# Patient Record
Sex: FEMALE | Race: WHITE | URBAN - METROPOLITAN AREA
[De-identification: names, ages, dates, MRNs, and addresses within clinical notes are randomized per-mention and may not be internally consistent; named-entity substitution may affect disease eponyms.]

---

## 2017-03-22 ENCOUNTER — HISTORICAL (OUTPATIENT)
Dept: ADMINISTRATIVE | Facility: HOSPITAL | Age: 77
End: 2017-03-22

## 2017-05-03 ENCOUNTER — HOSPITAL ENCOUNTER (OUTPATIENT)
Dept: ONCOLOGY | Facility: HOSPITAL | Age: 77
End: 2017-05-05
Attending: INTERNAL MEDICINE | Admitting: INTERNAL MEDICINE

## 2017-05-03 LAB
ABS NEUT (OLG): 6.99 X10(3)/MCL (ref 2.1–9.2)
ALBUMIN SERPL-MCNC: 2.8 GM/DL (ref 3.4–5)
ALBUMIN/GLOB SERPL: 0.7 {RATIO}
ALP SERPL-CCNC: 83 UNIT/L (ref 38–126)
ALT SERPL-CCNC: 7 UNIT/L (ref 12–78)
APPEARANCE, UA: ABNORMAL
APTT PPP: 25.6 SECOND(S) (ref 20.6–36)
AST SERPL-CCNC: 12 UNIT/L (ref 15–37)
BACTERIA SPEC CULT: ABNORMAL /HPF
BASOPHILS # BLD AUTO: 0 X10(3)/MCL (ref 0–0.2)
BASOPHILS NFR BLD AUTO: 0 %
BILIRUB SERPL-MCNC: 0.4 MG/DL (ref 0.2–1)
BILIRUB UR QL STRIP: NEGATIVE
BILIRUBIN DIRECT+TOT PNL SERPL-MCNC: 0.1 MG/DL (ref 0–0.2)
BILIRUBIN DIRECT+TOT PNL SERPL-MCNC: 0.3 MG/DL (ref 0–0.8)
BUN SERPL-MCNC: 24 MG/DL (ref 7–18)
CALCIUM SERPL-MCNC: 9 MG/DL (ref 8.5–10.1)
CHLORIDE SERPL-SCNC: 106 MMOL/L (ref 98–107)
CO2 SERPL-SCNC: 21 MMOL/L (ref 21–32)
COLOR UR: YELLOW
CREAT SERPL-MCNC: 1.58 MG/DL (ref 0.55–1.02)
EOSINOPHIL # BLD AUTO: 0 X10(3)/MCL (ref 0–0.9)
EOSINOPHIL NFR BLD AUTO: 0 %
ERYTHROCYTE [DISTWIDTH] IN BLOOD BY AUTOMATED COUNT: 14.9 % (ref 11.5–17)
GLOBULIN SER-MCNC: 3.9 GM/DL (ref 2.4–3.5)
GLUCOSE (UA): NEGATIVE
GLUCOSE SERPL-MCNC: 111 MG/DL (ref 74–106)
GROUP & RH: NORMAL
HCT VFR BLD AUTO: 28.3 % (ref 37–47)
HCT VFR BLD AUTO: 29 % (ref 37–47)
HCT VFR BLD AUTO: 31.8 % (ref 37–47)
HGB BLD-MCNC: 10.3 GM/DL (ref 12–16)
HGB BLD-MCNC: 9 GM/DL (ref 12–16)
HGB BLD-MCNC: 9.3 GM/DL (ref 12–16)
HGB UR QL STRIP: ABNORMAL
INR PPP: 1.55 (ref 0–1.27)
KETONES UR QL STRIP: NEGATIVE
LEUKOCYTE ESTERASE UR QL STRIP: ABNORMAL
LIPASE SERPL-CCNC: 289 UNIT/L (ref 73–393)
LYMPHOCYTES # BLD AUTO: 2.1 X10(3)/MCL (ref 0.6–4.6)
LYMPHOCYTES NFR BLD AUTO: 21 %
MCH RBC QN AUTO: 28.6 PG (ref 27–31)
MCHC RBC AUTO-ENTMCNC: 32.4 GM/DL (ref 33–36)
MCV RBC AUTO: 88.3 FL (ref 80–94)
MONOCYTES # BLD AUTO: 0.7 X10(3)/MCL (ref 0.1–1.3)
MONOCYTES NFR BLD AUTO: 7 %
NEUTROPHILS # BLD AUTO: 6.99 X10(3)/MCL (ref 1.4–7.9)
NEUTROPHILS NFR BLD AUTO: 70 %
NITRITE UR QL STRIP: NEGATIVE
PH UR STRIP: 6 [PH] (ref 5–9)
PLATELET # BLD AUTO: 330 X10(3)/MCL (ref 130–400)
PMV BLD AUTO: 9.1 FL (ref 9.4–12.4)
POTASSIUM SERPL-SCNC: 4.4 MMOL/L (ref 3.5–5.1)
PROT SERPL-MCNC: 6.7 GM/DL (ref 6.4–8.2)
PROT UR QL STRIP: NEGATIVE
PROTHROMBIN TIME: 18.3 SECOND(S) (ref 12.1–14.2)
RBC # BLD AUTO: 3.6 X10(6)/MCL (ref 4.2–5.4)
RBC #/AREA URNS HPF: 13 /HPF (ref 0–2)
SODIUM SERPL-SCNC: 136 MMOL/L (ref 136–145)
SP GR UR STRIP: 1.01 (ref 1–1.03)
SQUAMOUS EPITHELIAL, UA: ABNORMAL
UROBILINOGEN UR STRIP-ACNC: 0.2
WBC # SPEC AUTO: 9.9 X10(3)/MCL (ref 4.5–11.5)
WBC #/AREA URNS HPF: 7 /HPF (ref 0–3)

## 2017-05-04 LAB
ABS NEUT (OLG): 3.6 X10(3)/MCL (ref 2.1–9.2)
BASOPHILS # BLD AUTO: 0 X10(3)/MCL (ref 0–0.2)
BASOPHILS NFR BLD AUTO: 0 %
BUN SERPL-MCNC: 16 MG/DL (ref 7–18)
CALCIUM SERPL-MCNC: 9.3 MG/DL (ref 8.5–10.1)
CHLORIDE SERPL-SCNC: 111 MMOL/L (ref 98–107)
CO2 SERPL-SCNC: 19 MMOL/L (ref 21–32)
CREAT SERPL-MCNC: 1.29 MG/DL (ref 0.55–1.02)
EOSINOPHIL # BLD AUTO: 0.1 X10(3)/MCL (ref 0–0.9)
EOSINOPHIL NFR BLD AUTO: 2 %
ERYTHROCYTE [DISTWIDTH] IN BLOOD BY AUTOMATED COUNT: 15.4 % (ref 11.5–17)
GLUCOSE SERPL-MCNC: 109 MG/DL (ref 74–106)
HCT VFR BLD AUTO: 30.2 % (ref 37–47)
HGB BLD-MCNC: 9.4 GM/DL (ref 12–16)
LYMPHOCYTES # BLD AUTO: 1.8 X10(3)/MCL (ref 0.6–4.6)
LYMPHOCYTES NFR BLD AUTO: 30 %
MAGNESIUM SERPL-MCNC: 1.9 MG/DL (ref 1.8–2.4)
MCH RBC QN AUTO: 28.4 PG (ref 27–31)
MCHC RBC AUTO-ENTMCNC: 31.1 GM/DL (ref 33–36)
MCV RBC AUTO: 91.2 FL (ref 80–94)
MONOCYTES # BLD AUTO: 0.4 X10(3)/MCL (ref 0.1–1.3)
MONOCYTES NFR BLD AUTO: 7 %
NEUTROPHILS # BLD AUTO: 3.6 X10(3)/MCL (ref 1.4–7.9)
NEUTROPHILS NFR BLD AUTO: 60 %
PHOSPHATE SERPL-MCNC: 2.5 MG/DL (ref 2.5–4.9)
PLATELET # BLD AUTO: 268 X10(3)/MCL (ref 130–400)
PMV BLD AUTO: 9.1 FL (ref 9.4–12.4)
POTASSIUM SERPL-SCNC: 4.2 MMOL/L (ref 3.5–5.1)
RBC # BLD AUTO: 3.31 X10(6)/MCL (ref 4.2–5.4)
SODIUM SERPL-SCNC: 140 MMOL/L (ref 136–145)
WBC # SPEC AUTO: 6 X10(3)/MCL (ref 4.5–11.5)

## 2017-05-05 LAB
ABS NEUT (OLG): 2.93 X10(3)/MCL (ref 2.1–9.2)
BASOPHILS NFR BLD MANUAL: 1 % (ref 0–2)
BURR CELLS BLD QL SMEAR: 1
EOSINOPHIL NFR BLD MANUAL: 1 % (ref 0–8)
ERYTHROCYTE [DISTWIDTH] IN BLOOD BY AUTOMATED COUNT: 15.4 % (ref 11.5–17)
HCT VFR BLD AUTO: 29.1 % (ref 37–47)
HGB BLD-MCNC: 9.2 GM/DL (ref 12–16)
LYMPHOCYTES NFR BLD MANUAL: 23 % (ref 13–40)
LYMPHOCYTES NFR BLD MANUAL: 5 %
MCH RBC QN AUTO: 28 PG (ref 27–31)
MCHC RBC AUTO-ENTMCNC: 31.6 GM/DL (ref 33–36)
MCV RBC AUTO: 88.7 FL (ref 80–94)
MONOCYTES NFR BLD MANUAL: 4 % (ref 2–11)
NEUTROPHILS NFR BLD MANUAL: 66 % (ref 47–80)
PLATELET # BLD AUTO: 254 X10(3)/MCL (ref 130–400)
PLATELET # BLD EST: NORMAL 10*3/UL
PMV BLD AUTO: 9.2 FL (ref 7.4–10.4)
POIKILOCYTOSIS BLD QL SMEAR: 2
POLYCHROMASIA BLD QL SMEAR: 1
RBC # BLD AUTO: 3.28 X10(6)/MCL (ref 4.2–5.4)
SCHISTOCYTES BLD QL AUTO: 1
WBC # SPEC AUTO: 5.3 X10(3)/MCL (ref 4.5–11.5)

## 2018-10-24 ENCOUNTER — HOSPITAL ENCOUNTER (OUTPATIENT)
Dept: MEDSURG UNIT | Facility: HOSPITAL | Age: 78
End: 2018-10-27
Attending: INTERNAL MEDICINE | Admitting: INTERNAL MEDICINE

## 2018-10-24 LAB
ABS NEUT (OLG): 14.31 X10(3)/MCL (ref 2.1–9.2)
ABS NEUT (OLG): 15.18 X10(3)/MCL (ref 2.1–9.2)
ALBUMIN SERPL-MCNC: 2.9 GM/DL (ref 3.4–5)
ALBUMIN/GLOB SERPL: 0.8 {RATIO}
ALP SERPL-CCNC: 101 UNIT/L (ref 38–126)
ALT SERPL-CCNC: 16 UNIT/L (ref 12–78)
APPEARANCE, UA: ABNORMAL
APTT PPP: 36.2 SECOND(S) (ref 24.8–36.9)
AST SERPL-CCNC: 60 UNIT/L (ref 15–37)
BACTERIA SPEC CULT: ABNORMAL /HPF
BASOPHILS # BLD AUTO: 0 X10(3)/MCL (ref 0–0.2)
BASOPHILS # BLD AUTO: 0 X10(3)/MCL (ref 0–0.2)
BASOPHILS NFR BLD AUTO: 0 %
BASOPHILS NFR BLD AUTO: 0 %
BILIRUB SERPL-MCNC: 0.4 MG/DL (ref 0.2–1)
BILIRUB UR QL STRIP: NEGATIVE
BILIRUBIN DIRECT+TOT PNL SERPL-MCNC: 0.1 MG/DL (ref 0–0.2)
BILIRUBIN DIRECT+TOT PNL SERPL-MCNC: 0.3 MG/DL (ref 0–0.8)
BUN SERPL-MCNC: 78 MG/DL (ref 7–18)
BUN SERPL-MCNC: 78 MG/DL (ref 7–18)
CALCIUM SERPL-MCNC: 10.3 MG/DL (ref 8.5–10.1)
CALCIUM SERPL-MCNC: 10.6 MG/DL (ref 8.5–10.1)
CHLORIDE SERPL-SCNC: 113 MMOL/L (ref 98–107)
CHLORIDE SERPL-SCNC: 115 MMOL/L (ref 98–107)
CK SERPL-CCNC: 294 UNIT/L (ref 26–192)
CO2 SERPL-SCNC: 24 MMOL/L (ref 21–32)
CO2 SERPL-SCNC: 25 MMOL/L (ref 21–32)
COLOR UR: YELLOW
CREAT SERPL-MCNC: 2.49 MG/DL (ref 0.55–1.02)
CREAT SERPL-MCNC: 2.62 MG/DL (ref 0.55–1.02)
CREAT/UREA NIT SERPL: 31.3
EOSINOPHIL # BLD AUTO: 0 X10(3)/MCL (ref 0–0.9)
EOSINOPHIL # BLD AUTO: 0.1 X10(3)/MCL (ref 0–0.9)
EOSINOPHIL NFR BLD AUTO: 0 %
EOSINOPHIL NFR BLD AUTO: 0 %
ERYTHROCYTE [DISTWIDTH] IN BLOOD BY AUTOMATED COUNT: 14 % (ref 11.5–17)
ERYTHROCYTE [DISTWIDTH] IN BLOOD BY AUTOMATED COUNT: 14 % (ref 11.5–17)
GLOBULIN SER-MCNC: 3.7 GM/DL (ref 2.4–3.5)
GLUCOSE (UA): NEGATIVE
GLUCOSE SERPL-MCNC: 104 MG/DL (ref 74–106)
GLUCOSE SERPL-MCNC: 119 MG/DL (ref 74–106)
HCT VFR BLD AUTO: 40.2 % (ref 37–47)
HCT VFR BLD AUTO: 40.4 % (ref 37–47)
HGB BLD-MCNC: 12.3 GM/DL (ref 12–16)
HGB BLD-MCNC: 12.3 GM/DL (ref 12–16)
HGB UR QL STRIP: ABNORMAL
INR PPP: 1.89 (ref 0–1.27)
KETONES UR QL STRIP: NEGATIVE
LACTATE SERPL-SCNC: 1.6 MMOL/L (ref 0.4–2)
LEUKOCYTE ESTERASE UR QL STRIP: ABNORMAL
LYMPHOCYTES # BLD AUTO: 2.4 X10(3)/MCL (ref 0.6–4.6)
LYMPHOCYTES # BLD AUTO: 2.5 X10(3)/MCL (ref 0.6–4.6)
LYMPHOCYTES NFR BLD AUTO: 13 %
LYMPHOCYTES NFR BLD AUTO: 14 %
MAGNESIUM SERPL-MCNC: 3.2 MG/DL (ref 1.8–2.4)
MCH RBC QN AUTO: 30.2 PG (ref 27–31)
MCH RBC QN AUTO: 30.3 PG (ref 27–31)
MCHC RBC AUTO-ENTMCNC: 30.4 GM/DL (ref 33–36)
MCHC RBC AUTO-ENTMCNC: 30.6 GM/DL (ref 33–36)
MCV RBC AUTO: 98.8 FL (ref 80–94)
MCV RBC AUTO: 99.5 FL (ref 80–94)
MONOCYTES # BLD AUTO: 1 X10(3)/MCL (ref 0.1–1.3)
MONOCYTES # BLD AUTO: 1 X10(3)/MCL (ref 0.1–1.3)
MONOCYTES NFR BLD AUTO: 5 %
MONOCYTES NFR BLD AUTO: 6 %
NEUTROPHILS # BLD AUTO: 14.31 X10(3)/MCL (ref 2.1–9.2)
NEUTROPHILS # BLD AUTO: 15.18 X10(3)/MCL (ref 2.1–9.2)
NEUTROPHILS NFR BLD AUTO: 80 %
NEUTROPHILS NFR BLD AUTO: 80 %
NITRITE UR QL STRIP: NEGATIVE
PH UR STRIP: 5 [PH] (ref 5–9)
PHOSPHATE SERPL-MCNC: 4 MG/DL (ref 2.5–4.9)
PLATELET # BLD AUTO: 287 X10(3)/MCL (ref 130–400)
PLATELET # BLD AUTO: 357 X10(3)/MCL (ref 130–400)
PMV BLD AUTO: 10.3 FL (ref 9.4–12.4)
PMV BLD AUTO: 10.5 FL (ref 9.4–12.4)
POTASSIUM SERPL-SCNC: 4.9 MMOL/L (ref 3.5–5.1)
POTASSIUM SERPL-SCNC: 5.3 MMOL/L (ref 3.5–5.1)
PROT SERPL-MCNC: 6.6 GM/DL (ref 6.4–8.2)
PROT UR QL STRIP: NEGATIVE
PROTHROMBIN TIME: 22.3 SECOND(S) (ref 12.2–14.7)
RBC # BLD AUTO: 4.06 X10(6)/MCL (ref 4.2–5.4)
RBC # BLD AUTO: 4.07 X10(6)/MCL (ref 4.2–5.4)
RBC #/AREA URNS HPF: ABNORMAL /[HPF]
SODIUM SERPL-SCNC: 147 MMOL/L (ref 136–145)
SODIUM SERPL-SCNC: 149 MMOL/L (ref 136–145)
SP GR UR STRIP: 1.02 (ref 1–1.03)
SQUAMOUS EPITHELIAL, UA: ABNORMAL
TROPONIN I SERPL-MCNC: <0.02 NG/ML (ref 0.02–0.49)
UROBILINOGEN UR STRIP-ACNC: 0.2
WBC # SPEC AUTO: 17.9 X10(3)/MCL (ref 4.5–11.5)
WBC # SPEC AUTO: 18.9 X10(3)/MCL (ref 4.5–11.5)
WBC #/AREA URNS HPF: ABNORMAL /HPF

## 2018-10-25 LAB
APTT PPP: 35.8 SECOND(S) (ref 24.8–36.9)
INR PPP: 1.55 (ref 0–1.27)
PROTHROMBIN TIME: 19.1 SECOND(S) (ref 12.2–14.7)

## 2018-10-26 LAB
ABS NEUT (OLG): 8.28 X10(3)/MCL (ref 2.1–9.2)
ALBUMIN SERPL-MCNC: 2.4 GM/DL (ref 3.4–5)
ALBUMIN/GLOB SERPL: 0.8 RATIO (ref 1.1–2)
ALP SERPL-CCNC: 76 UNIT/L (ref 38–126)
ALT SERPL-CCNC: 22 UNIT/L (ref 12–78)
AST SERPL-CCNC: 22 UNIT/L (ref 15–37)
BASOPHILS # BLD AUTO: 0 X10(3)/MCL (ref 0–0.2)
BASOPHILS NFR BLD AUTO: 0 %
BILIRUB SERPL-MCNC: 0.2 MG/DL (ref 0.2–1)
BILIRUBIN DIRECT+TOT PNL SERPL-MCNC: 0.1 MG/DL (ref 0–0.5)
BILIRUBIN DIRECT+TOT PNL SERPL-MCNC: 0.1 MG/DL (ref 0–0.8)
BUN SERPL-MCNC: 77 MG/DL (ref 7–18)
CALCIUM SERPL-MCNC: 10.3 MG/DL (ref 8.5–10.1)
CHLORIDE SERPL-SCNC: 119 MMOL/L (ref 98–107)
CO2 SERPL-SCNC: 22 MMOL/L (ref 21–32)
CREAT SERPL-MCNC: 1.66 MG/DL (ref 0.55–1.02)
EOSINOPHIL # BLD AUTO: 0.1 X10(3)/MCL (ref 0–0.9)
EOSINOPHIL NFR BLD AUTO: 1 %
ERYTHROCYTE [DISTWIDTH] IN BLOOD BY AUTOMATED COUNT: 13.9 % (ref 11.5–17)
GLOBULIN SER-MCNC: 3.2 GM/DL (ref 2.4–3.5)
GLUCOSE SERPL-MCNC: 121 MG/DL (ref 74–106)
HCT VFR BLD AUTO: 35.8 % (ref 37–47)
HGB BLD-MCNC: 11 GM/DL (ref 12–16)
LYMPHOCYTES # BLD AUTO: 1.6 X10(3)/MCL (ref 0.6–4.6)
LYMPHOCYTES NFR BLD AUTO: 15 %
MAGNESIUM SERPL-MCNC: 2.9 MG/DL (ref 1.8–2.4)
MCH RBC QN AUTO: 30.8 PG (ref 27–31)
MCHC RBC AUTO-ENTMCNC: 30.7 GM/DL (ref 33–36)
MCV RBC AUTO: 100.3 FL (ref 80–94)
MONOCYTES # BLD AUTO: 0.6 X10(3)/MCL (ref 0.1–1.3)
MONOCYTES NFR BLD AUTO: 6 %
NEUTROPHILS # BLD AUTO: 8.28 X10(3)/MCL (ref 2.1–9.2)
NEUTROPHILS NFR BLD AUTO: 78 %
PLATELET # BLD AUTO: 214 X10(3)/MCL (ref 130–400)
PMV BLD AUTO: 11.6 FL (ref 9.4–12.4)
POTASSIUM SERPL-SCNC: 4.5 MMOL/L (ref 3.5–5.1)
PROT SERPL-MCNC: 5.6 GM/DL (ref 6.4–8.2)
RBC # BLD AUTO: 3.57 X10(6)/MCL (ref 4.2–5.4)
SODIUM SERPL-SCNC: 150 MMOL/L (ref 136–145)
WBC # SPEC AUTO: 10.6 X10(3)/MCL (ref 4.5–11.5)

## 2018-10-27 LAB
ABS NEUT (OLG): 6.94 X10(3)/MCL (ref 2.1–9.2)
ALBUMIN SERPL-MCNC: 2.1 GM/DL (ref 3.4–5)
ALBUMIN/GLOB SERPL: 0.7 RATIO (ref 1.1–2)
ALP SERPL-CCNC: 75 UNIT/L (ref 38–126)
ALT SERPL-CCNC: 12 UNIT/L (ref 12–78)
AST SERPL-CCNC: 37 UNIT/L (ref 15–37)
BASOPHILS # BLD AUTO: 0 X10(3)/MCL (ref 0–0.2)
BASOPHILS NFR BLD AUTO: 0 %
BILIRUB SERPL-MCNC: 0.2 MG/DL (ref 0.2–1)
BILIRUBIN DIRECT+TOT PNL SERPL-MCNC: 0.1 MG/DL (ref 0–0.5)
BILIRUBIN DIRECT+TOT PNL SERPL-MCNC: 0.1 MG/DL (ref 0–0.8)
BUN SERPL-MCNC: 62 MG/DL (ref 7–18)
CALCIUM SERPL-MCNC: 9.4 MG/DL (ref 8.5–10.1)
CHLORIDE SERPL-SCNC: 112 MMOL/L (ref 98–107)
CO2 SERPL-SCNC: 22 MMOL/L (ref 21–32)
CREAT SERPL-MCNC: 1.48 MG/DL (ref 0.55–1.02)
EOSINOPHIL # BLD AUTO: 0.2 X10(3)/MCL (ref 0–0.9)
EOSINOPHIL NFR BLD AUTO: 2 %
ERYTHROCYTE [DISTWIDTH] IN BLOOD BY AUTOMATED COUNT: 13.7 % (ref 11.5–17)
GLOBULIN SER-MCNC: 2.9 GM/DL (ref 2.4–3.5)
GLUCOSE SERPL-MCNC: 99 MG/DL (ref 74–106)
HCT VFR BLD AUTO: 33.6 % (ref 37–47)
HGB BLD-MCNC: 10.3 GM/DL (ref 12–16)
LYMPHOCYTES # BLD AUTO: 2 X10(3)/MCL (ref 0.6–4.6)
LYMPHOCYTES NFR BLD AUTO: 20 %
MAGNESIUM SERPL-MCNC: 2.5 MG/DL (ref 1.8–2.4)
MCH RBC QN AUTO: 30.3 PG (ref 27–31)
MCHC RBC AUTO-ENTMCNC: 30.7 GM/DL (ref 33–36)
MCV RBC AUTO: 98.8 FL (ref 80–94)
MONOCYTES # BLD AUTO: 0.6 X10(3)/MCL (ref 0.1–1.3)
MONOCYTES NFR BLD AUTO: 6 %
NEUTROPHILS # BLD AUTO: 6.94 X10(3)/MCL (ref 2.1–9.2)
NEUTROPHILS NFR BLD AUTO: 70 %
PLATELET # BLD AUTO: 258 X10(3)/MCL (ref 130–400)
PMV BLD AUTO: 10.6 FL (ref 9.4–12.4)
POTASSIUM SERPL-SCNC: 3.9 MMOL/L (ref 3.5–5.1)
PROT SERPL-MCNC: 5 GM/DL (ref 6.4–8.2)
RBC # BLD AUTO: 3.4 X10(6)/MCL (ref 4.2–5.4)
SODIUM SERPL-SCNC: 142 MMOL/L (ref 136–145)
WBC # SPEC AUTO: 9.9 X10(3)/MCL (ref 4.5–11.5)

## 2022-04-10 ENCOUNTER — HISTORICAL (OUTPATIENT)
Dept: ADMINISTRATIVE | Facility: HOSPITAL | Age: 82
End: 2022-04-10

## 2022-04-26 VITALS
DIASTOLIC BLOOD PRESSURE: 60 MMHG | SYSTOLIC BLOOD PRESSURE: 98 MMHG | WEIGHT: 152.13 LBS | HEIGHT: 63 IN | BODY MASS INDEX: 26.95 KG/M2

## 2022-04-30 NOTE — DISCHARGE SUMMARY
Patient:   Earline Rader            MRN: [id number]            FIN: [id number]               Age:   77 years     Sex:  Female     :  1940   Associated Diagnoses:   None   Author:   Cody Evangelista MD      Physical Examination   General:  Alert and oriented, No acute distress.    Eye:  Pupils are equal, round and reactive to light, Extraocular movements are intact, Normal conjunctiva.    HENT:  Normocephalic, Normal hearing, Oral mucosa is moist.    Neck:  Supple, No carotid bruit, No jugular venous distention, No thyromegaly.    Respiratory:  Lungs are clear to auscultation, Respirations are non-labored, Breath sounds are equal, No chest wall tenderness.    Cardiovascular:  Normal rate, Regular rhythm, No murmur, No gallop, No edema.    Gastrointestinal:  Soft, Non-tender, Non-distended, Normal bowel sounds, No organomegaly.       Vital Signs (last 24 hrs)_____  Last Charted___________  Temp Oral     37.5 DegC  (MAY 05 07:)  Heart Rate Peripheral   70 bpm  (MAY 05 07:)  Resp Rate         20 br/min  (MAY 05 07:)  SBP      137 mmHg  (MAY 05 07:)  DBP      74 mmHg  (MAY 05 07:)  SpO2      95 %  (MAY 05 07:21)  Weight      68.8 kg  (MAY 04 09:)  Height      165 cm  (MAY 04 09:)  BMI      25.27  (MAY 04 09:)     Lymphatics:  No lymphadenopathy neck, axilla, groin.    Musculoskeletal:  No tenderness, No swelling.    Integumentary:  Warm, Dry, Intact, No pallor, No rash.    Neurologic:  Alert, Oriented, Normal sensory, No focal deficits.    Psychiatric:  Cooperative, Appropriate mood & affect.    Genitourinary:  No costovertebral angle tenderness, No inguinal tenderness.       Hospital Course   Hospital Course   Admitted from: This is a 77-year-old female who comes in with complaints of hematemesis for which she was admitted and GI was consulted.  Patient was recently diagnosed with A. fib and has been on Eliquis which was held on admit.  Patient underwent an EGD on 5/3/2017 that shows  grade D reflux esophagitis and medium size sliding hiatal hernia and some erythematous antrum.  GI had cleared the patient for oral diet which patient is eating and tolerating well.  H&H monitored.  Patient also diagnosed with UTI present on admit which has grown Enterococcus faecalis.  Patient initially given Rocephin 1 g IV once and then later switched to penicillin and Zosyn IV while in hospital.  This will be switched to ampicillin 250 mg by mouth twice a day ×1 week on discharge based on sensitivities.  GI recommends restarting Eliquis on 5/6/2017 if overt bleeding seen.  Patient comes in from [facility] but will be discharged home with home health per family wishes.  Over the course of her stay patient is now clinically improved and hemodynamically stable for discharge to home when cleared by GI.    Discharge diagnosis:    Hematemesis  UTI with Enterococcus faecalis  Nausea and vomiting  Recent diagnosis of atrial fibrillation,  History of hypertension  History of Parkinson's disease  Dementia  History of GERD  Chronic kidney disease stage III       Discharge home with home health if okay by GI  Diet equal soft bland cardiac diet ×1 week and then slowly advance as tolerated  Activity ad vida.  Follow-up with PCP and GI ×1 week  Total discharge time = 31 minutes.        Discharge Plan   Discharge Summary Plan   Discharge Status: stable.

## 2022-04-30 NOTE — DISCHARGE SUMMARY
Patient:   Earline Rader            MRN: [id number]            FIN: [id number]               Age:   78 years     Sex:  Female     :  1940   Associated Diagnoses:   None   Author:   Carrol SANCHEZ, Vamshi TURPIN      Results Review   please see today's progress note for DC summary. time spent on discharge disposition included the following: final examination of the patient; discussion of the hospital stay; instructions for continuing care; final diagnoses; patient/family counseling; preparation of discharge records; preparation of prescriptions; referral forms; chart review.  Total time spent on discharge disposition was 36 minutes.

## 2022-04-30 NOTE — ED PROVIDER NOTES
Patient:   Earline Rader            MRN: [id number]            FIN: [id number]               Age:   78 years     Sex:  Female     :  1940   Associated Diagnoses:   Acute hypoactive delirium due to another medical condition; Dehydration; Acute UTI   Author:   Jonas Jalloh MD      Basic Information   Time seen: Date & time 10/24/2018 15:46:00.   Arrival mode: Private vehicle.   History limitation: None.   Additional information: Chief Complaint from Nursing Triage Note : Chief Complaint   10/24/2018 15:34 CDT     Chief Complaint           c/o increased confusion, lethargy, and not responding to questioning worsening over last 2 days and n/v today. Reports decreased appetite and oral intakex2 dasys. Hx Parkinson's, DM, and afib. On blood thinners.   (Modified) .   History source: Daughter.      History of Present Illness   The patient presents with 77 y/o female who presents with daughter for increase in confusion, not acting normal baseline, slumped over in chair. per daughter this started monday but worsened today. hx parkinsons, dm, afib. on thinners. cbg 144. eboutte, fnp.  The onset was 3  days ago and gradual.  The course/duration of symptoms is worsening.  The character of symptoms is decreased responsiveness and slumping to the left, which is a new finding for her.  The degree at onset was moderate.  The degree at present is moderate.  Baseline status: confused.   The exacerbating factor is none.  The relieving factor is none.  Risk factors consist of hypertension.  Prior episodes: none.  Therapy today: none.  Associated symptoms: none.  Additional history: none.        Review of Systems   Constitutional symptoms:  Negative except as documented in HPI.   Skin symptoms:  Negative except as documented in HPI.   Eye symptoms:  Negative except as documented in HPI.   ENMT symptoms:  Negative except as documented in HPI.   Respiratory symptoms:  Negative except as documented in HPI.    Cardiovascular symptoms:  Negative except as documented in HPI.   Gastrointestinal symptoms:  Negative except as documented in HPI.   Genitourinary symptoms:  Negative except as documented in HPI.   Musculoskeletal symptoms:  Negative except as documented in HPI.   Neurologic symptoms:  Negative except as documented in HPI.   Psychiatric symptoms:  Negative except as documented in HPI.   Endocrine symptoms:  Negative except as documented in HPI.   Hematologic/Lymphatic symptoms:  Negative except as documented in HPI.   Allergy/immunologic symptoms:  Negative except as documented in HPI.             Additional review of systems information: All other systems reviewed and otherwise negative.      Health Status   Allergies:    Allergic Reactions (Selected)  Severity Not Documented  Aspirin- No reactions were documented.,    Allergies (1) Active Reaction  aspirin None Documented.   Medications:  (Selected)   Prescriptions  Prescribed  Eliquis 2.5 mg oral tablet: 2.5 mg = 1 tab(s), Oral, BID, # 60 tab(s), 11 Refill(s)  Protonix 40 mg ORAL enteric coated tablet: 40 mg = 1 tab(s), Oral, BID, And then Protonix 40 g by mouth daily ×30 days  #30tabs, # 28 tab(s), 0 Refill(s)  Sinemet 25 mg-100 mg oral tablet: 1 tab(s), Oral, TID, # 90 tab(s), 5 Refill(s), Pharmacy: [place] [id number] [facility]  metoprolol tartrate 25 mg oral tab: 25 mg = 1 tab(s), Oral, BID, # 60 tab(s), 11 Refill(s)  Documented Medications  Documented  Linzess: 145 mcg, Oral, Daily, PRN constipation, 0 Refill(s)  Megace: 400 mg = 10 mL, Oral, BID, 0 Refill(s).      Past Medical/ Family/ Social History   Medical history:    Resolved  Benign hematuria (15617017):  Resolved.  Atrial fibrillation (10885605):  Resolved.  Gastro-esophageal reflux disease without esophagitis (678790797):  Resolved.  Dementia (30791922):  Resolved.  Parkinson disease (0928553758):  Resolved.  Chronic kidney disease (7650507492):  Resolved.  Constipation (36243730):  Resolved.  Muscle  weakness (39106160):  Resolved.  Nausea & vomiting ([id number]):  Resolved.  Gait abnormality ([id number]):  Resolved.  Lack of coordination (7470421980):  Resolved.  Cognitive communication disorder (0351141459):  Resolved.  Malaise (419184330):  Resolved.  Anorexia (017799126):  Resolved..   Surgical history:    Esophagogastroduodenoscopy performed by Trevon Simpson MD on 5/3/2017 at 77 Years.  Comments:  5/3/2017 14:11 - Quincy BERNSTEIN, Rani VILLANUEVA  auto-populated from documented surgical case  Hysterectomy (SNOMED CT 199476278)..   Family history:    Kidney disease.  Mother  Peripheral vascular disease.  Sister  .   Social history: Not significant.   Problem list:    Active Problems (8)  Acid reflux   Acute disease or injury-related malnutrition   Constipation   Hypertension   Impaired mobility   Impaired mobility   Parkinson disease   Restless leg syndrome .      Physical Examination               Vital Signs   Vital Signs   10/24/2018 15:34 CDT     Temperature Temporal Artery               37 DegC                             Peripheral Pulse Rate     85 bpm                             SpO2                      95 %                             Oxygen Therapy            Room air                             Systolic Blood Pressure   107 mmHg                             Diastolic Blood Pressure  75 mmHg  .      Vital Signs (last 24 hrs)_____  Last Charted___________  Heart Rate Peripheral   85 bpm  (OCT 24 15:34)  SBP      107 mmHg  (OCT 24 15:34)  DBP      75 mmHg  (OCT 24 15:34)  SpO2      95 %  (OCT 24 15:34).   Measurements   10/24/2018 15:34 CDT     Weight Dosing             64 kg                             Weight Measured and Calculated in Lbs     141.09 lb                             Weight Estimated          64 kg                             Height/Length Dosing      157 cm                             Height/Length Estimated   157 cm                              Body Mass Index Estimated 25.96 kg/m2  .   Basic Oxygen Information   10/24/2018 15:34 CDT     SpO2                      95 %                             Oxygen Therapy            Room air  .   General:  Alert, mild distress.    [place] coma scale:  Eye response: 4 /4, verbal response: 1 /5, motor response: 4 /6, Total score: Total score: 9.    Neurological:  No focal neurological deficit observed, normal sensory observed, normal coordination observed, Level of consciousness: Lethargic, Cranial nerves II - XII: Intact, Motor strength: not moving right side spontaneously; more active with LUE/LLE, Speech: Aphasic.    Skin:  Warm, dry, pale, poor turgor, abrasions to posterior left shoulder, and lateral left hip/thigh.    Head:  Normocephalic, atraumatic.    Neck:  Supple, trachea midline, no tenderness, no JVD, no carotid bruit.    Eye:  Pupils are equal, round and reactive to light, extraocular movements are intact, normal conjunctiva, vision unchanged.    Ears, nose, mouth and throat:  Tympanic membranes clear, oral mucosa moist, no pharyngeal erythema or exudate.    Cardiovascular:  Regular rate and rhythm, No murmur, Normal peripheral perfusion, No edema.    Respiratory:  Lungs are clear to auscultation, respirations are non-labored, breath sounds are equal, Symmetrical chest wall expansion.    Chest wall:  No tenderness, No deformity.    Back:  Nontender, Normal range of motion, Normal alignment.    Musculoskeletal:  No swelling, no deformity.    Gastrointestinal:  Soft, Nontender, Non distended, Normal bowel sounds, No organomegaly.    Lymphatics:  No lymphadenopathy.   Psychiatric:  unable to assess.      Medical Decision Making   Orders  Launch Orders   Laboratory:  UA Total a reflex to culture (Order): Stat collect, Urine, 10/24/2018 15:47 CDT, Nurse collect, Print Label By Order Location  Troponin-I (Order): Stat collect, 10/24/2018 15:47 CDT, Blood, Lab Collect, Print Label By Order Location, 10/24/2018  15:47 CDT  CMP (Order): Stat collect, 10/24/2018 15:47 CDT, Blood, Lab Collect, Print Label By Order Location, 10/24/2018 15:47 CDT  PTT (Order): Stat collect, 10/24/2018 15:47 CDT, Blood, Lab Collect, Print Label By Order Location, 10/24/2018 15:47 CDT  PT (Order): Stat collect, 10/24/2018 15:47 CDT, Blood, Lab Collect, Print Label By Order Location, 10/24/2018 15:47 CDT  CBC w/ Auto Diff (Order): Now collect, 10/24/2018 15:47 CDT, Blood, Lab Collect, Print Label By Order Location, 10/24/2018 15:47 CDT  Radiology:  CT Head W/O Contrast (Order): Stat, 10/24/2018 15:47 CDT, Altered level of Consciousness, None, Patient Bed, Patient Has IV?, Rad Type, Schedule this test  Cardiology:  EKG (Order): 10/24/2018 15:47 CDT, Patient Bed, Patient Has IV, Standard Precautions, NOW, -1, -1, 10/24/2018 15:47 CDT, Launch Orders   Laboratory:  Phosphorus Level (Order Processing): Stat collect, 10/24/2018 16:00 CDT, Blood, Once, Stop date 10/24/2018 16:00 CDT, Lab Collect, Print Label By Order Location, 10/24/2018 16:00 CDT  Magnesium Level (Order Processing): Stat collect, 10/24/2018 16:00 CDT, Blood, Once, Stop date 10/24/2018 16:00 CDT, Lab Collect, Print Label By Order Location, 10/24/2018 16:00 CDT  CPK (Order Processing): Stat collect, 10/24/2018 16:00 CDT, Blood, Once, Stop date 10/24/2018 16:00 CDT, Lab Collect, Print Label By Order Location, 10/24/2018 16:00 CDT.    Electrocardiogram:  Rate 82, normal sinus rhythm, The Axis is normal.  , STT segments Non specific changes, T wave Inversion, III, , AVF, Ectopy Premature atrial contractions, P wave and ND interval WNL, QT interval WNL, QRS interval WNL, Interpretation by Emergency Physician No significant interval changes.    Results review:  Lab results : Lab View   10/24/2018 16:30 CDT     UA Appear                 TURBID                             UA Color                  YELLOW                             UA Spec Grav              1.016                             UA  Bili                   Negative                             UA pH                     5.0                             UA Urobilinogen           0.2                             UA Blood                  Trace                             UA Glucose                Negative                             UA Ketones                Negative                             UA Protein                Negative                             UA Nitrite                Negative                             UA Leuk Est               2+                             UA WBC                    2-3 /HPF                             UA RBC                    NONE SEEN                             UA Bacteria               4+ /HPF                             UA Squam Epithelial       NONE SEEN    10/24/2018 16:03 CDT     Sodium Lvl                149 mmol/L  HI                             Potassium Lvl             5.3 mmol/L  HI                             Chloride                  115 mmol/L  HI                             CO2                       24.0 mmol/L                             Calcium Lvl               10.3 mg/dL  HI                             Magnesium Lvl             3.2 mg/dL  HI                             Glucose Lvl               119 mg/dL  HI                             BUN                       78.0 mg/dL  HI                             Creatinine                2.62 mg/dL  HI                             eGFR-AA                   23 mL/min/1.73 m2  NA                             eGFR-CATHY                  19 mL/min/1.73 m2  NA                             Bili Total                0.4 mg/dL                             Bili Direct               0.10 mg/dL                             Bili Indirect             0.30 mg/dL                             AST                       60 unit/L  HI                             ALT                       16 unit/L                             Alk Phos                  101 unit/L                              Total Protein             6.6 gm/dL                             Albumin Lvl               2.90 gm/dL  LOW                             Globulin                  3.70 gm/dL  HI                             A/G Ratio                 0.8  NA                             Phosphorus                4.0 mg/dL                             Total CK                  294 unit/L  HI                             Troponin-I                <0.02 ng/mL                             PT                        22.3 second(s)  HI                             INR                       1.89  HI                             PTT                       36.2 second(s)                             WBC                       18.9 x10(3)/mcL  HI                             RBC                       4.07 x10(6)/mcL  LOW                             Hgb                       12.3 gm/dL                             Hct                       40.2 %                             Platelet                  357 x10(3)/mcL                             MCV                       98.8 fL  HI                             MCH                       30.2 pg                             MCHC                      30.6 gm/dL  LOW                             RDW                       14.0 %                             MPV                       10.3 fL                             Abs Neut                  15.18 x10(3)/mcL  HI                             Neutro Auto               80 %  NA                             Lymph Auto                13 %  NA                             Mono Auto                 5 %  NA                             Eos Auto                  0 %  NA                             Abs Eos                   0.0 x10(3)/mcL                             Basophil Auto             0 %  NA                             Abs Neutro                15.18 x10(3)/mcL  HI                             Abs Lymph                 2.5 x10(3)/mcL                             Abs Mono                   1.0 x10(3)/mcL                             Abs Baso                  0.0 x10(3)/mcL  ,    No qualifying data available.    Head Computed Tomography:  Interpretation by Radiologist,   Radiology Report     Clinical History:  Altered level of consciousness     Reference:  17 April 2017     Technique:  CT imaging of the head performed from the skull base to the vertex  without intravenous contrast. DLP 1882 mGycm. Automatic exposure  control, adjustment of mA/kV or iterative reconstruction technique was  used to reduce radiation.     Findings:  There is motion and beam hardening artifact.     No acute cortical infarct, hemorrhage or abnormal extra-axial fluid  collection identified. No midline shift, mass lesion or focal mass  effect noted. Grossly similar hypoattenuation in the cerebral white  matter. There is similar ventricular enlargement.      Visualized paranasal sinuses and mastoid air cells grossly clear.     Impression:  Exam degraded by motion and beam hardening artifact. No acute  intracranial findings are evident.       Signature Line  Electronically Signed By: Faustino Lebron MD  Date/Time Signed: 10/24/2018 17:53    .       Reexamination/ Reevaluation   Vital signs   Basic Oxygen Information   10/24/2018 15:34 CDT     SpO2                      95 %                             Oxygen Therapy            Room air        Impression and Plan   Diagnosis   Acute hypoactive delirium due to another medical condition (FWB72-EO F05)   Dehydration (HTK80-EG E86.0)   Acute UTI (UJN35-FS N39.0)      Calls-Consults   -  10/24/2018 18:46:00 , Fatou Rasheed med, phone call, recommends admit to Dr. Martins.    Plan   Condition: Stable.    Disposition: Admit time  10/24/2018 18:57:00, Admit to Inpatient Unit.    Counseled: Patient, Regarding diagnosis, Regarding diagnostic results, Regarding treatment plan, Regarding prescription.

## 2022-04-30 NOTE — H&P
Patient:   Earline Rader            MRN: [id number]            FIN: [id number]               Age:   77 years     Sex:  Female     :  1940   Associated Diagnoses:   None   Author:   Jonas Velarde MD      DATE OF ADMIT: 5/3/2017 4:43  REFERRAL SOURCE: Marian SANCHEZ   PCP: Neal SANCHEZ     CHIEF COMPLAINT: hematemesis     HISTORY OF PRESENTING ILLNESS  Patient is a 77-year-old female with past medical history of hypertension, Parkinson's, GERD, dementia, CKD3 and several recent hospitalizations.  She was last discharged on 2017 after an admission for nausea and vomiting, UTI and diagnosed with atrial fibrillation and placed on Eliquis and metoprolol for rate control.  She was discharged to [facility].  She was sent back to the ER tonight because she was noted to be vomiting bright red blood, with no information on how much.  She reports that balm was black and that she had one episode.  She currently denies any nausea or vomiting at the moment but was reported to be dry heaving in the ER but had no vomitus.  Her dementia seems advanced and she is unable to give a very adequate history. Her vital signs were stable on arrival and she was normotensive, mildly tachycardic and afebrile.  Laboratory work revealed an increase in her H&H since discharge.  Urine again shows evidence of urinary tract infection.    REVIEW OF SYSTEMS  Except as documented, all other systems reviewed and negative    PAST MEDICAL HISTORY  HTN  PAF on eliquis  CKD III  GERD  Parkinsons     PAST SURGICAL HISTORY  Hysterectomy    FAMILY HISTORY  Reviewed, noncontributory to current condition.    SOCIAL HISTORY  No reported tobacco drug or alcohol use  Quit smoking over 30 years ago  [facility] resident    ALLERGIES  aspirin    HOME MEDICATIONS  Eliquis 2.5 mg oral tablet 2.5 mg = 1 tab(s), Oral, BID  Linzess 145 mcg, PRN, Oral, Daily  Megace 400 mg = 10 mL, Oral, BID  metoprolol tartrate 25 mg oral tab 25 mg  = 1 tab(s), Oral, BID  Sinemet 25 mg-100 mg oral tablet 1 tab(s), Oral, TID      PHYSICAL EXAM  Temp Oral        37.2 DegC  (MAY 03 01:41)  Heart Rate Peripheral   90 bpm  (MAY 03 01:50)  Resp Rate             16 br/min  (MAY 03 04:10)  SBP            124 mmHg  (MAY 03 04:10)  DBP            61 mmHg  (MAY 03 04:10)  SpO2           98 %  (MAY 03 04:10)  GENERAL: awake alert and in NAD but answers questions appropriately but is confused  HEENT: Normocephalic atraumatic, cranial nerves II through XII intact, pupils equal  NECK: Supple  CHEST: Clear to auscultation bilaterally no wheezing or rales  CVS: Regular rate and rhythm, no significant peripheral edema  ABD: Soft nontender nondistended positive bowel sounds  EXTREMITIES: No clubbing cyanosis or edema  NEURO: Grossly nonfocal  SKIN: Pale dry and intact    LABS  reviewed and stable from discharge 4/27  UA shows possible UTI     RADIOLOGY  none today     ASSESSMENT  1.  Hematemesis, in setting of Eliquis, with no signs of hemodynamic instability  2.  Nausea and vomiting  3.  Dementia  4.  Recent diagnosis of atrial fibrillation, placed on Eliquis and metoprolol  5.  History of hypertension  6.  History of Parkinson's disease  7.  History of GERD  8.  Chronic renal insufficiency stage III    PLAN  - hold eliquis   - protonix IV, IVFs and trend H/H  - prn nausea medication   - possibly secondary to retching from nausea  - consider GI consult if persists     ADVANCED DIRECTIVES: None full code   VTE PROPHYLAXIS: holding AC for now, SCDs    Total time spent on admission: 71 minutes

## 2022-04-30 NOTE — H&P
Patient:   Earline Rader            MRN: [id number]            FIN: [id number]               Age:   78 years     Sex:  Female     :  1940   Associated Diagnoses:   Acute hypoactive delirium due to another medical condition; Acute UTI; Altered mental status; Anorexia; Chronic kidney disease; Dehydration   Author:   Joann SANCHEZ, Eliezer      Basic Information   Admit information:  Weakness Dementia uti  .    Source of history:  Family member.    Present at bedside:  Family member.    Referral source:  Emergency department.    History limitation:  Clinical condition.       Chief Complaint   10/24/2018 15:34 CDT     c/o increased confusion, lethargy, and not responding to questioning worsening over last 2 days and n/v today. Reports decreased appetite and oral intakex2 dasys. Hx Parkinson's, DM, and afib. On blood thinners.   (Modified)       History of Present Illness             The patient presents with A 78 year old  female presented to the er with c/o increased weakness and ams with decreased responsiveness and lethargy when she was found slumped over on the recliner no trauma dependent on all adls symptoms have been progressive for the last 3 days triaged and evaluated in the er and found to be dehydrated weak demented with contracted extremities unkept with a uti and admitted to the [facility] [place] .        Review of Systems   Has per HPI otherwise all systems reviewed and negative.   Constitutional:  Weakness, Fatigue, Decreased activity.    Eye:  Negative.    Ear/Nose/Mouth/Throat:  Negative.    Respiratory:  Negative.    Cardiovascular:  Negative.    Gastrointestinal:  Negative.    Genitourinary:  Negative.    Hematology/Lymphatics:  Negative.    Endocrine:  Negative.    Immunologic:  Immunocompromised, Recurrent fevers, Recurrent infections, Malaise.    Musculoskeletal:  Decreased range of motion.    Integumentary:  Negative.    Neurologic:  Abnormal balance, Confusion.       Health Status    Allergies:    Allergic Reactions (All)  Severity Not Documented  Aspirin- No reactions were documented.,    Allergies (1) Active Reaction  aspirin None Documented   Current medications:  (Selected)   Inpatient Medications  Ordered  Eliquis 2.5 mg oral tablet: 2.5 mg, form: Tab, Oral, BID, first dose 10/24/18 21:00:00 CDT  Protonix 40 mg ORAL enteric coated tablet: 40 mg, form: Tab-EC, Oral, BID, first dose 10/24/18 21:00:00 CDT  Protonix: 40 mg, IV Piggyback, Daily, first dose 10/24/18 20:16:00 CDT, STAT  Rocephin 1 g injection: 2 gm, form: Injection, IV Piggyback, q24hr, first dose 10/24/18 20:16:00 CDT, STAT  Sinemet 25 mg-100 mg oral tablet: 1 tab(s), form: Tab, Oral, TID, first dose 10/24/18 20:17:00 CDT  Sodium Chloride 0.9% intravenous solution 1,000 mL: 1,000 mL, 1,000 mL, IV, 125 mL/hr, start date 10/24/18 20:16:00 CDT  Zofran: 4 mg, form: Injection, IV Push, q4hr PRN for nausea, first dose 10/24/18 20:16:00 CDT, choose first if ordered with other treatments for nausea  acetaminophen: 650 mg, Oral, q6hr PRN for fever, first dose 10/24/18 20:16:00 CDT, > 38.1 degrees Celsius  labetalol: 20 mg, form: Soln, IV Push, q2hr PRN for hypertension, first dose 10/24/18 20:16:00 CDT, STAT, SBP > _160___ and/or DBP > _90___ not to exceed 300mg/24hrs  Prescriptions  Prescribed  Eliquis 2.5 mg oral tablet: 2.5 mg = 1 tab(s), Oral, BID, # 60 tab(s), 11 Refill(s)  Protonix 40 mg ORAL enteric coated tablet: 40 mg = 1 tab(s), Oral, BID, And then Protonix 40 g by mouth daily ×30 days  #30tabs, # 28 tab(s), 0 Refill(s)  Sinemet 25 mg-100 mg oral tablet: 1 tab(s), Oral, TID, # 90 tab(s), 5 Refill(s), Pharmacy: [facility] [id number] [facility]  metoprolol tartrate 25 mg oral tab: 25 mg = 1 tab(s), Oral, BID, # 60 tab(s), 11 Refill(s)  Documented Medications  Documented  Linzess: 145 mcg, Oral, Daily, PRN constipation, 0 Refill(s)  Megace: 400 mg = 10 mL, Oral, BID, 0 Refill(s)  XARELTO 15 MG TABLET: ,    Medications (9) Active  Scheduled:  (5)  apixaban 2.5 mg tablet  2.5 mg 1 tab(s), Oral, BID  carbidopa-levodopa 25-100mg Tab UD  1 tab(s), Oral, TID  cefTRIAXone 1 gm Inj  2 gm 2 EA, IV Piggyback, q24hr  pantoprazole  Inj  40 mg 1 EA, IV Piggyback, Daily  pantoprazole 40 mg EC Tab  40 mg 1 tab(s), Oral, BID  Continuous: (1)  sodium chloride 0.9% 1,000 mL  1,000 mL, IV, 125 mL/hr  PRN: (3)  acetaminophen  650 mg, Oral, q6hr  labetalol 5 mg/mL 20mL vial  20 mg 4 mL, IV Push, q2hr  ondansetron 2 mg/mL inj - 2mL  4 mg 2 mL, IV Push, q4hr   Problem list:    All Problems  Acid reflux / SNOMED CT [id number] / Confirmed  Acute disease or injury-related malnutrition / SNOMED CT [id number] / Confirmed  Constipation / SNOMED CT [id number] / Confirmed  Hypertension / SNOMED CT [id number] / Confirmed  Impaired mobility / SNOMED CT 444420972 / Confirmed  Parkinson disease / SNOMED CT [id number] / Confirmed  Impaired mobility / SNOMED CT 0525585830 / Confirmed  Restless leg syndrome / SNOMED CT 3463624472 / Confirmed,    Active Problems (8)  Acid reflux   Acute disease or injury-related malnutrition   Constipation   Hypertension   Impaired mobility   Impaired mobility   Parkinson disease   Restless leg syndrome       Histories   Past Medical History:    Resolved  Benign hematuria (04294606):  Resolved.  Atrial fibrillation (69627552):  Resolved.  Gastro-esophageal reflux disease without esophagitis (025097319):  Resolved.  Dementia (25918252):  Resolved.  Parkinson disease (3178156541):  Resolved.  Chronic kidney disease (7605732068):  Resolved.  Constipation (04151101):  Resolved.  Muscle weakness (69634986):  Resolved.  Nausea & vomiting ([id number]):  Resolved.  Gait abnormality ([id number]):  Resolved.  Lack of coordination (5326025506):  Resolved.  Cognitive communication disorder (9599748913):   Resolved.  Malaise (356529153):  Resolved.  Anorexia (220274203):  Resolved.   Family History:    Kidney disease.  Mother  Peripheral vascular disease.  Sister     Procedure history:    Esophagogastroduodenoscopy on 5/3/2017 at 77 Years.  Comments:  5/3/2017 14:11 - Quincy BERNSTEIN, Rani VILLANUEVA  auto-populated from documented surgical case  Hysterectomy ([id number]).   Social History        Social & Psychosocial Habits    Alcohol  09/18/2015  Use: Past    Substance Abuse  09/18/2015  Use: Never    Tobacco  09/18/2015  Use: Former smoker    Comment: Quit 30 years ago - 09/18/2015 10:09 - Kathleen Naqvi LPN.        Physical Examination   Vital Signs   10/24/2018 20:00 CDT     Peripheral Pulse Rate     70 bpm                             Heart Rate Monitored      77 bpm                             Respiratory Rate          15 br/min                             SpO2                      97 %                             Oxygen Therapy            Room air                             Systolic Blood Pressure   115 mmHg                             Diastolic Blood Pressure  77 mmHg                             Mean Arterial Pressure, Cuff              90 mmHg    10/24/2018 19:08 CDT     Respiratory Rate          19 br/min                             SpO2                      95 %                             Oxygen Therapy            Room air    10/24/2018 19:02 CDT     Peripheral Pulse Rate     70 bpm                             Heart Rate Monitored      74 bpm                             Respiratory Rate          20 br/min                             SpO2                      96 %                             Oxygen Therapy            Room air                             Systolic Blood Pressure   122 mmHg                             Diastolic Blood Pressure  77 mmHg                             Mean Arterial Pressure, Cuff              92 mmHg    10/24/2018 18:30 CDT     Peripheral Pulse Rate     69 bpm                              Heart Rate Monitored      68 bpm                             Respiratory Rate          21 br/min                             SpO2                      96 %                             Oxygen Therapy            Room air                             Systolic Blood Pressure   119 mmHg                             Diastolic Blood Pressure  72 mmHg                             Mean Arterial Pressure, Cuff              88 mmHg    10/24/2018 17:30 CDT     Peripheral Pulse Rate     69 bpm                             Heart Rate Monitored      69 bpm                             Respiratory Rate          17 br/min                             SpO2                      97 %                             Oxygen Therapy            Room air                             Systolic Blood Pressure   108 mmHg                             Diastolic Blood Pressure  68 mmHg                             Mean Arterial Pressure, Cuff              81 mmHg    10/24/2018 16:30 CDT     Peripheral Pulse Rate     74 bpm                             Heart Rate Monitored      78 bpm                             Respiratory Rate          23 br/min                             SpO2                      97 %                             Oxygen Therapy            Room air                             Systolic Blood Pressure   128 mmHg                             Diastolic Blood Pressure  76 mmHg                             Mean Arterial Pressure, Cuff              93 mmHg    10/24/2018 15:34 CDT     Temperature Temporal Artery               37 DegC                             Peripheral Pulse Rate     85 bpm                             SpO2                      95 %                             Oxygen Therapy            Room air                             Systolic Blood Pressure   107 mmHg                             Diastolic Blood Pressure  75 mmHg        Vital Signs (last 24 hrs)_____  Last Charted___________  Heart Rate Peripheral   70 bpm  (OCT 24 20:00)  Resp  Rate         15 br/min  (OCT 24 20:00)  SBP      115 mmHg  (OCT 24 20:00)  DBP      77 mmHg  (OCT 24 20:00)  SpO2      97 %  (OCT 24 20:00)   Intake and Output   Fluid Balance Primitives   5/4/2017 23:00 CDT       Oral Intake               0 mL                             Urine Count               2                             Stool Count               2    5/4/2017 7:00 CDT        Oral Intake               360 mL                             Urine Catheter            500 mL                             Stool Count               0        General:  No acute distress, Not alert and oriented.    Eye:  Pupils are equal, round and reactive to light, Normal conjunctiva.    HENT:  Normocephalic, Normal hearing, Oral mucosa is moist.    Neck:  Supple, Non-tender, No carotid bruit.    Respiratory:  Lungs are clear to auscultation, Respirations are non-labored, Breath sounds are equal.    Cardiovascular:  Normal rate, Regular rhythm, No murmur.    Gastrointestinal:  Soft, Non-tender, Non-distended, Normal bowel sounds.    Genitourinary:  No costovertebral angle tenderness.    Lymphatics:  No lymphadenopathy neck, axilla, groin.    Musculoskeletal:          Mobility/ gait: Unable to walk.         Upper extremity exam: contracted ext.    Integumentary:  Warm, Dry, Intact.    Neurologic:  Not alert, Not oriented     Altered level of consciousness: Confused, Lethargic.    Psychiatric:  Not cooperative, Not appropriate mood & affect.       Review / Management   Laboratory Results   Today's Lab Results : PowerNote Discrete Results   10/24/2018 18:40 CDT     Lactic Acid Lvl           1.6 mmol/L    10/24/2018 16:30 CDT     UA Appear                 TURBID                             UA Color                  YELLOW                             UA Spec Grav              1.016                             UA Bili                   Negative                             UA pH                     5.0                             UA Urobilinogen            0.2                             UA Blood                  Trace                             UA Glucose                Negative                             UA Ketones                Negative                             UA Protein                Negative                             UA Nitrite                Negative                             UA Leuk Est               2+                             UA WBC                    2-3 /HPF                             UA RBC                    NONE SEEN                             UA Bacteria               4+ /HPF                             UA Squam Epithelial       NONE SEEN    10/24/2018 16:03 CDT     WBC                       18.9 x10(3)/mcL  HI                             RBC                       4.07 x10(6)/mcL  LOW                             Hgb                       12.3 gm/dL                             Hct                       40.2 %                             Platelet                  357 x10(3)/mcL                             MCV                       98.8 fL  HI                             MCH                       30.2 pg                             MCHC                      30.6 gm/dL  LOW                             RDW                       14.0 %                             MPV                       10.3 fL                             Abs Neut                  15.18 x10(3)/mcL  HI                             Neutro Auto               80 %  NA                             Lymph Auto                13 %  NA                             Mono Auto                 5 %  NA                             Eos Auto                  0 %  NA                             Abs Eos                   0.0 x10(3)/mcL                             Basophil Auto             0 %  NA                             Abs Neutro                15.18 x10(3)/mcL  HI                             Abs Lymph                 2.5 x10(3)/mcL                             Abs Mono                   1.0 x10(3)/mcL                             Abs Baso                  0.0 x10(3)/mcL                             PT                        22.3 second(s)  HI                             INR                       1.89  HI                             PTT                       36.2 second(s)                             Sodium Lvl                149 mmol/L  HI                             Potassium Lvl             5.3 mmol/L  HI                             Chloride                  115 mmol/L  HI                             CO2                       24.0 mmol/L                             Calcium Lvl               10.3 mg/dL  HI                             Magnesium Lvl             3.2 mg/dL  HI                             Glucose Lvl               119 mg/dL  HI                             BUN                       78.0 mg/dL  HI                             Creatinine                2.62 mg/dL  HI                             eGFR-AA                   23 mL/min/1.73 m2  NA                             eGFR-CATHY                  19 mL/min/1.73 m2  NA                             Bili Total                0.4 mg/dL                             Bili Direct               0.10 mg/dL                             Bili Indirect             0.30 mg/dL                             AST                       60 unit/L  HI                             ALT                       16 unit/L                             Alk Phos                  101 unit/L                             Total Protein             6.6 gm/dL                             Albumin Lvl               2.90 gm/dL  LOW                             Globulin                  3.70 gm/dL  HI                             A/G Ratio                 0.8  NA                             Phosphorus                4.0 mg/dL                             Total CK                  294 unit/L  HI                             Troponin-I                <0.02 ng/mL    10/24/2018 15:44 CDT     POC  CBG                   144 mg/dL  HI        Radiology results   Rad Results (ST)   Accession: [id number]  Order: CT Head W/O Contrast  Report Dt/Tm: 10/24/2018 17:55  Report:      Clinical History:  Altered level of consciousness     Reference:  17 April 2017     Technique:  CT imaging of the head performed from the skull base to the vertex  without intravenous contrast. DLP 1882 mGycm. Automatic exposure  control, adjustment of mA/kV or iterative reconstruction technique was  used to reduce radiation.     Findings:  There is motion and beam hardening artifact.     No acute cortical infarct, hemorrhage or abnormal extra-axial fluid  collection identified. No midline shift, mass lesion or focal mass  effect noted. Grossly similar hypoattenuation in the cerebral white  matter. There is similar ventricular enlargement.      Visualized paranasal sinuses and mastoid air cells grossly clear.     Impression:  Exam degraded by motion and beam hardening artifact. No acute  intracranial findings are evident.       Condition:  Guarded.       Impression and Plan   Diagnosis     Acute hypoactive delirium due to another medical condition (EHG69-IL F05).     Acute UTI (TNV78-EP N39.0).     Altered mental status (PNED [id number]).     Anorexia (HIJ00-JC R63.0).     Chronic kidney disease (MBJ61-IO N18.9).     Dehydration (BGA92-XA E86.0).     Course:  Progressing as expected.    Orders      (Selected)   Inpatient Orders  Ordered  Admission Assessment Adult: 10/24/18 19:17:59 CDT, Stop date 10/24/18 19:17:59 CDT  Admission History Adult: 10/24/18 19:17:59 CDT, Stop date 10/24/18 19:17:59 CDT  Admit as Inpatient: 10/24/18 18:58:00 CDT, Joann SANCHEZ, [facility], Acute hypoactive delirium due to another medical condition  Dehydration  Acute UTI, No  Basic Admission Information Adult: 10/24/18 19:17:59 CDT, Stop date 10/24/18 19:17:59 CDT  Bedrest: 10/24/18 20:16:00 CDT, Stop date 10/24/18 20:16:00  CDT  Capacity Management Bed Request: 10/24/18 19:07:05 CDT, Medical Unit  Discharge Planning Ongoing Assessment: 10/27/18 9:00:00 CDT, q3day  EKG: 10/24/18 15:47:00 CDT, 10/24/18 15:47:00 CDT, Patient Bed, Patient Has IV, Standard Precautions, NOW, -1, -1, 10/24/18 15:47:00 CDT  Eliquis 2.5 mg oral tablet: 2.5 mg, form: Tab, Oral, BID, first dose 10/24/18 21:00:00 CDT  Fall Risk Protocol: 10/24/18 16:10:09 CDT, Constant Order  Immunizations Quality Measures: 10/24/18 19:18:00 CDT, qShift  Incentive Spirometry Nursing: 10/24/18 20:16:00 CDT, q2hr, 10/24/18 21:00:00 CDT  NPO: 10/24/18 20:16:00 CDT, CM NPO  Neurological Assessment: 10/24/18 20:16:00 CDT, q4hr, 10/24/18 21:00:00 CDT  Notify Provider Vital Signs: 10/24/18 20:16:00 CDT, HR > 135, HR < 55, SBP > 190, SBP < 90, RR > 28, UO < 200mL in 8hr  Of chest pain [AFTER STAT: 1) EKG,  2) Pulse Ox, 3) Vital Signs]: 10/24/18 20:16:00 CDT, Of chest pain [AFTER STAT: 1) EKG,  2) Pulse Ox, 3) Vital Signs]  Oxygen Therapy: 10/24/18 20:16:00 CDT, 2, Nasal Cannula, titrate to keep SpO2 >= 92%, CM Oxygen  Perineal Care: 10/24/18 20:16:46 CDT, BID  Possible Sepsis: 10/24/18 17:18:01 CDT, Once  Protonix 40 mg ORAL enteric coated tablet: 40 mg, form: Tab-EC, Oral, BID, first dose 10/24/18 21:00:00 CDT  Protonix: 40 mg, IV Piggyback, Daily, first dose 10/24/18 20:16:00 CDT, STAT  Risk Screening: 10/24/18 19:18:00 CDT, BID  Rocephin 2 gm/D5W 50 mL IVPB (Premix): 2 gm, form: Infusion, IV Piggyback, q24hr, Infuse over: 0.5 hr, first dose 10/24/18 20:16:00 CDT, STAT  Saline Lock Insert: 10/24/18 20:16:00 CDT, Stop date 10/24/18 20:16:00 CDT  Sinemet 25 mg-100 mg oral tablet: 1 tab(s), form: Tab, Oral, TID, first dose 10/24/18 20:17:00 CDT  Sodium Chloride 0.9% intravenous solution 1,000 mL: 1,000 mL, 1,000 mL, IV, 125 mL/hr, start date 10/24/18 20:16:00 CDT  Telemetry Monitoring: 10/24/18 20:16:00 CDT, Stop date 10/24/18 20:16:00 CDT, Patient Bed, Patient has IV, Standard  Precautions, CM Telemetry Monitoring  Urinary Catheter Insertion: 10/24/18 20:16:00 CDT, Indwelling, Yes  Urinary Catheter Monitoring: 10/24/18 20:16:46 CDT, BID  Urinary Catheter Nurse Driven Protocol: BID, 10/24/18 20:16:46 CDT  VTE Quality Measures: 10/24/18 19:18:00 CDT, qShift  Vital Signs: 10/24/18 20:16:00 CDT, q4hr  Zofran: 4 mg, form: Injection, IV Push, q4hr PRN for nausea, first dose 10/24/18 20:16:00 CDT, choose first if ordered with other treatments for nausea  acetaminophen: 650 mg, form: Tab, Oral, q6hr PRN for fever, first dose 10/24/18 20:16:00 CDT, > 38.1 degrees Celsius  labetalol: 20 mg, form: Soln, IV Push, q2hr PRN for hypertension, first dose 10/24/18 20:16:00 CDT, STAT, SBP > _160___ and/or DBP > _90___ not to exceed 300mg/24hrs  of patients room number and as needed with any acute change or worsening condition: 10/24/18 20:16:00 CDT, of patients room number and as needed with any acute change or worsening condition  Ordered (Dispatched)  BMP: Routine collect, 10/24/18 20:16:00 CDT, Blood, Once, Stop date 10/24/18 21:00:00 CDT, Lab Collect, in AM, Print Label By Order Location, 10/24/18 20:16:00 CDT  CBC w/ Auto Diff: Routine collect, 10/24/18 20:16:00 CDT, Blood, Once, Stop date 10/24/18 21:00:00 CDT, Lab Collect, in AM, Print Label By Order Location, 10/24/18 20:16:00 CDT  Culture Blood: 10/24/18 20:16:00 CDT, Routine collect, Blood, Once, Stop date 10/24/18 21:00:00 CDT, Print Label By Order Location  Culture Blood: 10/24/18 20:16:00 CDT, Routine collect, Blood, Once, Stop date 10/24/18 21:00:00 CDT, Print Label By Order Location  Ordered (In-Lab)  Blood Culture: 10/24/18 18:22:00 CDT, Stat collect, Blood, Arm R, Stop date 10/24/18 18:40:00 CDT, Print Label By Order Location  Blood Culture: 10/24/18 18:22:00 CDT, Stat collect, Blood, Hand R, Stop date 10/24/18 18:30:00 CDT, Print Label By Order Location  Urine Culture [id number]: Stat collect, 10/24/18 16:30:00 CDT, Urine, Collected, Nurse  collect, [id number], Stop date 10/24/18 16:30:00 CDT, Print Label By Order Location  Ordered (Scheduled)  PT: Routine collect, 10/25/18 5:00:00 CDT, Blood, Once, Stop date 10/25/18 5:00:00 CDT, Lab Collect, in AM, Print Label By Order Location, 10/25/18 5:00:00 CDT  PTT: Routine collect, 10/25/18 5:00:00 CDT, Blood, Once, Stop date 10/25/18 5:00:00 CDT, Lab Collect, in AM, Print Label By Order Location, 10/25/18 5:00:00 CDT.     Sepsis with weakness  - admit to the [facility]  - possible source urine  - iv fluids  -iv rocephin  - bld cx x 2  - ucx    Dementia  - poor prognosis    Dehydration   - iv fluids   - repeat labs in am .     Education and Follow-up:          Counseled: Family, Regarding diagnosis, Regarding treatment, Regarding medications.         Discharge Planning: Plan to discharge ( To home ), Confusion.       Quality Measures   Sepsis   Severe sepsis: possible.     Suspected source of infection: urinary source.

## 2022-04-30 NOTE — ED PROVIDER NOTES
Patient:   Earline Rader            MRN: [id number]            FIN: [id number]               Age:   77 years     Sex:  Female     :  1940   Associated Diagnoses:   Acute UTI; Hematemesis; MEL (acute kidney injury)   Author:   Breezy Fonseca MD      Basic Information   Time seen: Date & time 5/3/2017 02:00:00.   History source: EMS, nursing home records.   Arrival mode: Ambulance.   History limitation: Cognitive impairment.   Additional information: Chief Complaint from Nursing Triage Note : Chief Complaint   5/3/2017 1:41 CDT        Chief Complaint           patient from [facility], vomiting with bright red blood  .      History of Present Illness   The patient presents with 77 year old female presents to the ED via EMS as a transfer from [facility] after experiencing hematemesis onset today. Per the [facility] facility the patient experienced bright red hematemesis prompting her to visit the ED for evaluation. The patient reports associated abdominal pain. The patients ROS is limited due to dementia. The patient takes Eliquis daily. The patient has a history of dementia, a-fib and Parkinson's. .  The onset was today.  The course/duration of symptoms is constant.  The character of symptoms is bloody.  The degree at present is moderate.  The exacerbating factor is none.  The relieving factor is none.  Risk factors consist of none.  Therapy today: emergency medical services and transferred from [facility].  Associated symptoms: none.  Additional history: none.        Review of Systems             Additional review of systems information: Unable to obtain due to: Dementia.      Health Status   Allergies:    Allergic Reactions (Selected)  Severity Not Documented  Aspirin- No reactions were documented..   Medications:  (Selected)   Inpatient Medications  Ordered  Sodium Chloride 0.9% 1000mL 1,000 mL: 1,000 mL, 1,000 mL, IV, 250 mL/hr, start date 17 2:08:00  CDT  Completed  Protonix 40 mg Vial (IV Push): 40 mg, form: Injection, IV Slow, Once, Infuse over: 2 minute(s), first dose 05/03/17 2:07:00 CDT, stop date 05/03/17 2:07:00 CDT, STAT, 24  Zofran 2 mg/mL injectable solution: 4 mg, form: Injection, IV Push, Once, first dose 05/03/17 2:07:00 CDT, stop date 05/03/17 2:07:00 CDT, STAT, 24  Prescriptions  Prescribed  Eliquis 2.5 mg oral tablet: 2.5 mg = 1 tab(s), Oral, BID, # 60 tab(s), 11 Refill(s)  metoprolol tartrate 25 mg oral tab: 25 mg = 1 tab(s), Oral, BID, # 60 tab(s), 11 Refill(s)  Documented Medications  Documented  Linzess: 145 mcg, Oral, Daily, PRN constipation, 0 Refill(s)  Megace: 400 mg = 10 mL, Oral, BID, 0 Refill(s)  Sinemet 25 mg-100 mg oral tablet: 1 tab(s), Oral, TID, 0 Refill(s).   Immunizations: Per nurse's notes.      Past Medical/ Family/ Social History   Medical history:    Resolved  Benign hematuria ([id number]):  Resolved., parkinsons, a-fib, dementia.   Surgical history:    Hysterectomy (SNOMED CT [id number])..   Family history:    Kidney disease.  Mother  Peripheral vascular disease.  Sister  .   Social history: Family/social situation: Nursing home resident.   Problem list: Per nurse's notes.      Physical Examination               Vital Signs   Vital Signs   5/3/2017 1:50 CDT        Peripheral Pulse Rate     90 bpm                             Respiratory Rate          16 br/min                             SpO2                      98 %                             Oxygen Therapy            Room air                             Systolic Blood Pressure   132 mmHg                             Diastolic Blood Pressure  91 mmHg  HI    5/3/2017 1:41 CDT        Temperature Oral          37.2 DegC                             Peripheral Pulse Rate     103 bpm  HI                             Respiratory Rate          20 br/min                             SpO2                      97 %                             Oxygen Therapy            Room air                              Systolic Blood Pressure   155 mmHg  HI                             Diastolic Blood Pressure  83 mmHg  .   Measurements   5/3/2017 1:41 CDT        Weight Dosing             75 kg                             Weight Measured and Calculated in Lbs     165.34 lb                             Weight Estimated          75 kg  .   Basic Oxygen Information   5/3/2017 1:50 CDT        SpO2                      98 %                             Oxygen Therapy            Room air    5/3/2017 1:41 CDT        SpO2                      97 %                             Oxygen Therapy            Room air  .   General:  Alert, no acute distress.    Skin:  Warm, dry.    Head:  Normocephalic, atraumatic.    Neck:  Supple, trachea midline, no tenderness.    Eye:  Pupils are equal, round and reactive to light, extraocular movements are intact.    Ears, nose, mouth and throat:  Oral mucosa moist, no pharyngeal erythema or exudate.    Cardiovascular:  No murmur, Normal peripheral perfusion, No edema, Irregular rhythm, Tachycardia.    Respiratory:  Lungs are clear to auscultation, respirations are non-labored, breath sounds are equal, Symmetrical chest wall expansion.    Chest wall:  No tenderness.   Back:  Nontender, Normal range of motion, Normal alignment.    Musculoskeletal:  Normal ROM, normal strength, no tenderness.    Gastrointestinal:  Soft, Non distended, Normal bowel sounds, Tenderness: Mild, epigastric, Guarding: Negative, Rebound: Negative.    Neurological:  Level of consciousness: Confused, demented.   Lymphatics:  No lymphadenopathy.   Psychiatric:  Cooperative.      Medical Decision Making   Documents reviewed:  Emergency department nurses' notes.   Orders  Laboratory    CBC w/ Auto DiffMarian MD, Michael L, 05/03/17, 02:07, Ordered    CMPMarian MD, Michael L, 05/03/17, 02:07, Ordered    Lipase LevelMarian MD, Michael L, 05/03/17, 02:07, Ordered    PTMarian MD, Michael L, 05/03/17, 02:07, Ordered     PTT, Breezy Fonseca MD, 05/03/17, 02:07, Ordered    Type and Ab Screen, Breezy Fonseca MD, 05/03/17, 02:08, Ordered    Type and Rh, Breezy Fonseca MD, 05/03/17, 02:08, Ordered    Antibody Screen for transfusion  (Indirect Lucero), Breezy Fonseca MD, 05/03/17, 02:08, Ordered    Urinalysis Complete a reflex to culture, Breezy Fonseca MD, 05/03/17, 02:08, Ordered.   Electrocardiogram:  Time 5/3/2017 02:31:00, rate 80, No ST-T changes, no ectopy, normal WI & QRS intervals, EP Interp, The Rhythm is atrial fibrillation.  .    Results review:  Lab results : Lab View   5/3/2017 2:45 CDT        UA Appear                 CLOUDY                             UA Color                  YELLOW                             UA Spec Grav              1.012                             UA Bili                   Negative                             UA pH                     6.0                             UA Urobilinogen           0.2                             UA Blood                  1+                             UA Glucose                Negative                             UA Ketones                Negative                             UA Protein                Negative                             UA Nitrite                Negative                             UA Leuk Est               Trace                             UA WBC                    7 /HPF  HI                             UA RBC                    13 /HPF  HI                             UA Bacteria               1+ /HPF                             UA Squam Epithelial       NONE SEEN    5/3/2017 2:25 CDT        Sodium Lvl                136 mmol/L                             Potassium Lvl             4.4 mmol/L                             Chloride                  106 mmol/L                             CO2                       21.0 mmol/L                             Calcium Lvl               9.0 mg/dL                             Glucose Lvl                111 mg/dL  HI                             BUN                       24.0 mg/dL  HI                             Creatinine                1.58 mg/dL  HI                             eGFR-AA                   41 mL/min/1.73 m2  NA                             eGFR-CATHY                  34 mL/min/1.73 m2  NA                             Bili Total                0.4 mg/dL                             Bili Direct               0.10 mg/dL                             Bili Indirect             0.30 mg/dL                             AST                       12 unit/L  LOW                             ALT                       7 unit/L  LOW                             Alk Phos                  83 unit/L                             Total Protein             6.7 gm/dL                             Albumin Lvl               2.80 gm/dL  LOW                             Globulin                  3.90 gm/dL  HI                             A/G Ratio                 0.7  NA                             Lipase Lvl                289 unit/L                             PT                        18.3 second(s)  HI                             INR                       1.55  HI                             PTT                       25.6 second(s)                             WBC                       9.9 x10(3)/mcL                             RBC                       3.60 x10(6)/mcL  LOW                             Hgb                       10.3 gm/dL  LOW                             Hct                       31.8 %  LOW                             Platelet                  330 x10(3)/mcL                             MCV                       88.3 fL                             MCH                       28.6 pg                             MCHC                      32.4 gm/dL  LOW                             RDW                       14.9 %                             MPV                       9.1 fL  LOW                             Abs Neut                   6.99 x10(3)/mcL                             Neutro Auto               70 %  NA                             Lymph Auto                21 %  NA                             Mono Auto                 7 %  NA                             Eos Auto                  0 %  NA                             Abs Eos                   0.0 x10(3)/mcL                             Basophil Auto             0 %  NA                             Abs Neutro                6.99 x10(3)/mcL                             Abs Lymph                 2.1 x10(3)/mcL                             Abs Mono                  0.7 x10(3)/mcL                             Abs Baso                  0.0 x10(3)/mcL                             ABO/Rh                    O POS                             Antibody Screen           Negative  .      Reexamination/ Reevaluation   Vital signs   Basic Oxygen Information   5/3/2017 1:50 CDT        SpO2                      98 %                             Oxygen Therapy            Room air    5/3/2017 1:41 CDT        SpO2                      97 %                             Oxygen Therapy            Room air     0 400patient without further vomiting in the ED.  Nursing home reports patient vomiting bright red blood and she is on Eliquis.  Certainly has evidence of a UTI based on her urinalysis.  We will also admit for serial H&H's and Protonix.      Impression and Plan   Diagnosis   Acute UTI (FAL36-DZ N39.0)   Hematemesis (WQF73-YL K92.0)   MEL (acute kidney injury) (TON83-GL N17.9)   Plan   Condition: Stable.    Disposition: Admit time  5/3/2017 04:05:00, Place in Observation Unit, Syd SANCHEZ, Jonas JOSHI    Notes: I, Rylie Brooke, acted solely as a scribe for and in the presence of Dr. Fonseca who performed the service. , I  performed all of the above services as recorded in this chart.